# Patient Record
Sex: FEMALE | Race: WHITE
[De-identification: names, ages, dates, MRNs, and addresses within clinical notes are randomized per-mention and may not be internally consistent; named-entity substitution may affect disease eponyms.]

---

## 2017-03-10 ENCOUNTER — HOSPITAL ENCOUNTER (EMERGENCY)
Dept: HOSPITAL 26 - MED | Age: 37
LOS: 1 days | Discharge: HOME | End: 2017-03-11
Payer: MEDICAID

## 2017-03-10 VITALS — HEIGHT: 64 IN | WEIGHT: 144 LBS | BODY MASS INDEX: 24.59 KG/M2

## 2017-03-10 VITALS — DIASTOLIC BLOOD PRESSURE: 71 MMHG | SYSTOLIC BLOOD PRESSURE: 104 MMHG

## 2017-03-10 DIAGNOSIS — R10.30: ICD-10-CM

## 2017-03-10 DIAGNOSIS — O26.891: Primary | ICD-10-CM

## 2017-03-10 DIAGNOSIS — Z3A.12: ICD-10-CM

## 2017-03-10 DIAGNOSIS — R03.0: ICD-10-CM

## 2017-03-11 VITALS — SYSTOLIC BLOOD PRESSURE: 104 MMHG | DIASTOLIC BLOOD PRESSURE: 71 MMHG

## 2017-03-11 NOTE — NUR
36Y F BIB NIECE C/O OF ABD PAIN, DENIES N/V. PAIN 9/10 IN SCALE THAT STARTED 
LAST NIGHT AROUND 8PM. V/S WNL.

## 2017-03-11 NOTE — NUR
Patient discharged with v/s stable. Written and verbal after care instructions 
given and explained. 

Patient verbalized understanding. Ambulatory with to car. All questions 
addressed prior to discharge. Advised to follow up with PMD.

## 2017-04-18 ENCOUNTER — HOSPITAL ENCOUNTER (EMERGENCY)
Dept: HOSPITAL 26 - MED | Age: 37
Discharge: HOME | End: 2017-04-18
Payer: MEDICAID

## 2017-04-18 VITALS — DIASTOLIC BLOOD PRESSURE: 58 MMHG | SYSTOLIC BLOOD PRESSURE: 120 MMHG

## 2017-04-18 VITALS — DIASTOLIC BLOOD PRESSURE: 72 MMHG | SYSTOLIC BLOOD PRESSURE: 114 MMHG

## 2017-04-18 VITALS — HEIGHT: 64 IN | WEIGHT: 147.5 LBS | BODY MASS INDEX: 25.18 KG/M2

## 2017-04-18 DIAGNOSIS — Z3A.17: ICD-10-CM

## 2017-04-18 DIAGNOSIS — O26.892: Primary | ICD-10-CM

## 2017-04-18 DIAGNOSIS — R51: ICD-10-CM

## 2017-04-18 PROCEDURE — 81002 URINALYSIS NONAUTO W/O SCOPE: CPT

## 2017-04-18 PROCEDURE — 70450 CT HEAD/BRAIN W/O DYE: CPT

## 2017-04-18 PROCEDURE — 99284 EMERGENCY DEPT VISIT MOD MDM: CPT

## 2017-04-18 PROCEDURE — 96372 THER/PROPH/DIAG INJ SC/IM: CPT

## 2017-04-18 PROCEDURE — 76801 OB US < 14 WKS SINGLE FETUS: CPT

## 2017-04-18 PROCEDURE — 81025 URINE PREGNANCY TEST: CPT

## 2017-04-18 NOTE — NUR
36/F PRESENTS TO ED FOR EVALUATION OF LEFT SIDE HEADACHE, LEFT JAW PAIN, LEFT 
FACE X3 DAYS. PT STATES SHE WENT TO SEE HER DENTIST ON SATURDAY AND THEY TOLD 
HER SHE DOES NOT HAVE ANY INFECTION. PATIENT C/O 8/10 PAIN. PT DENIES DIZZINESS 
OR SYNCOPE. PT DENIES CHANGES IN VISION. PT IS AOX4, [place] SPEAKING. VSS.

## 2017-04-18 NOTE — NUR
Patient discharged with v/s stable. Written and verbal after care instructions 
given and explained. 

Patient alert, oriented and verbalized understanding of instructions. 
Ambulatory with steady gait. All questions addressed prior to discharge. ID 
band removed. Patient advised to follow up with PMD. Rx of COMPAZINE AND 
BENADRYL given. Patient educated on indication of medication including possible 
reaction and side effects. Opportunity to ask questions provided and answered.

## 2017-11-17 ENCOUNTER — HOSPITAL ENCOUNTER (EMERGENCY)
Dept: HOSPITAL 26 - MED | Age: 37
Discharge: LEFT BEFORE DISCHARGE | End: 2017-11-17
Payer: MEDICAID

## 2017-11-17 VITALS — BODY MASS INDEX: 26.63 KG/M2 | WEIGHT: 156 LBS | HEIGHT: 64 IN

## 2017-11-17 VITALS — SYSTOLIC BLOOD PRESSURE: 117 MMHG | DIASTOLIC BLOOD PRESSURE: 62 MMHG

## 2017-11-17 DIAGNOSIS — R51: Primary | ICD-10-CM

## 2017-11-17 DIAGNOSIS — Z53.21: ICD-10-CM

## 2017-11-22 ENCOUNTER — HOSPITAL ENCOUNTER (EMERGENCY)
Dept: HOSPITAL 26 - MED | Age: 37
LOS: 1 days | Discharge: HOME | End: 2017-11-23
Payer: MEDICAID

## 2017-11-22 VITALS — DIASTOLIC BLOOD PRESSURE: 82 MMHG | SYSTOLIC BLOOD PRESSURE: 141 MMHG

## 2017-11-22 VITALS — BODY MASS INDEX: 26.12 KG/M2 | WEIGHT: 153 LBS | HEIGHT: 64 IN

## 2017-11-22 DIAGNOSIS — J02.9: Primary | ICD-10-CM

## 2017-11-22 PROCEDURE — 99284 EMERGENCY DEPT VISIT MOD MDM: CPT

## 2017-11-22 PROCEDURE — 87081 CULTURE SCREEN ONLY: CPT

## 2017-11-22 PROCEDURE — 36415 COLL VENOUS BLD VENIPUNCTURE: CPT

## 2017-11-22 PROCEDURE — 87804 INFLUENZA ASSAY W/OPTIC: CPT

## 2017-11-22 PROCEDURE — 96372 THER/PROPH/DIAG INJ SC/IM: CPT

## 2017-11-23 VITALS — DIASTOLIC BLOOD PRESSURE: 66 MMHG | SYSTOLIC BLOOD PRESSURE: 111 MMHG

## 2017-11-23 NOTE — NUR
Patient discharged with v/s stable. Written and verbal after care instructions 
given and explained. 

Patient alert, oriented and verbalized understanding of instructions. 
Ambulatory with steady gait. All questions addressed prior to discharge. ID 
band removed. Patient advised to follow up with PMD. Rx of prednisone given. 
Patient educated on indication of medication including possible reaction and 
side effects. Opportunity to ask questions provided and answered.

## 2019-08-06 ENCOUNTER — HOSPITAL ENCOUNTER (EMERGENCY)
Dept: HOSPITAL 26 - MED | Age: 39
Discharge: HOME | End: 2019-08-06
Payer: COMMERCIAL

## 2019-08-06 VITALS — DIASTOLIC BLOOD PRESSURE: 74 MMHG | SYSTOLIC BLOOD PRESSURE: 110 MMHG

## 2019-08-06 VITALS — HEIGHT: 64 IN | BODY MASS INDEX: 23.41 KG/M2 | WEIGHT: 137.12 LBS

## 2019-08-06 VITALS — DIASTOLIC BLOOD PRESSURE: 72 MMHG | SYSTOLIC BLOOD PRESSURE: 112 MMHG

## 2019-08-06 DIAGNOSIS — N39.0: ICD-10-CM

## 2019-08-06 DIAGNOSIS — G44.209: Primary | ICD-10-CM

## 2019-08-06 DIAGNOSIS — R42: ICD-10-CM

## 2019-08-06 LAB
APPEARANCE UR: CLEAR
BILIRUB UR QL STRIP: NEGATIVE
COLOR UR: YELLOW
GLUCOSE UR STRIP-MCNC: NEGATIVE MG/DL
HGB UR QL STRIP: NEGATIVE
LEUKOCYTE ESTERASE UR QL STRIP: NEGATIVE
NITRITE UR QL STRIP: NEGATIVE
PH UR STRIP: 8 [PH] (ref 5–9)

## 2019-08-06 PROCEDURE — 81025 URINE PREGNANCY TEST: CPT

## 2019-08-06 PROCEDURE — 96374 THER/PROPH/DIAG INJ IV PUSH: CPT

## 2019-08-06 PROCEDURE — 99283 EMERGENCY DEPT VISIT LOW MDM: CPT

## 2019-08-06 PROCEDURE — 96372 THER/PROPH/DIAG INJ SC/IM: CPT

## 2019-08-06 PROCEDURE — 96375 TX/PRO/DX INJ NEW DRUG ADDON: CPT

## 2019-08-06 PROCEDURE — 81003 URINALYSIS AUTO W/O SCOPE: CPT

## 2019-08-06 PROCEDURE — 87086 URINE CULTURE/COLONY COUNT: CPT

## 2019-08-06 PROCEDURE — 96361 HYDRATE IV INFUSION ADD-ON: CPT

## 2019-08-06 NOTE — NUR
BIB SELF C/O HEADACHE, DIZZINESS, AND PHOTOPHOBIA X 0600. DENIES N/V. 
DISCOMFORT UPON SWALLOWING X 3 WEEKS, BURNING URINATION X 9 DAYS. FINISHED UTI 
ABX YESTERDAY. UC CALLED PT YESTERDAY TO RETURN DUE TO BACTERIA IN URINE. 
DENIES ABD PAIN/BACK PAIN. 

A&OX4, CLEAR SPEECH, NO WEAKNESS, NO FACIAL DROOP, AMBULATES WITH STEADY GAIT. 



HX: LUPUS, SJOGREN'S SYNDROME

RX: DENIES

## 2019-08-06 NOTE — NUR
Patient discharged with v/s stable. Written and verbal after care instructions 
given and explained. Patient alert, oriented and verbalized understanding of 
instructions. Ambulatory with steady gait. All questions addressed prior to 
discharge. ID band removed. Patient advised to follow up with PMD. Rx of 
Bactrim and Tylenol  given. Patient educated on indication of medication 
including possible reaction and side effects. Opportunity to ask questions 
provided and answered.

## 2019-11-11 ENCOUNTER — HOSPITAL ENCOUNTER (EMERGENCY)
Dept: HOSPITAL 26 - MED | Age: 39
Discharge: HOME | End: 2019-11-11
Payer: COMMERCIAL

## 2019-11-11 VITALS — DIASTOLIC BLOOD PRESSURE: 53 MMHG | SYSTOLIC BLOOD PRESSURE: 100 MMHG

## 2019-11-11 VITALS — DIASTOLIC BLOOD PRESSURE: 89 MMHG | SYSTOLIC BLOOD PRESSURE: 119 MMHG

## 2019-11-11 VITALS — WEIGHT: 137 LBS | HEIGHT: 64 IN | BODY MASS INDEX: 23.39 KG/M2

## 2019-11-11 DIAGNOSIS — R10.2: Primary | ICD-10-CM

## 2019-11-11 DIAGNOSIS — Z88.6: ICD-10-CM

## 2019-11-11 DIAGNOSIS — Z88.0: ICD-10-CM

## 2019-11-11 LAB
ALBUMIN FLD-MCNC: 4.1 G/DL (ref 3.4–5)
ANION GAP SERPL CALCULATED.3IONS-SCNC: 13.9 MMOL/L (ref 8–16)
AST SERPL-CCNC: 21 U/L (ref 15–37)
BASOPHILS # BLD AUTO: 0 K/UL (ref 0–0.22)
BASOPHILS NFR BLD AUTO: 0.4 % (ref 0–2)
BILIRUB SERPL-MCNC: 0.3 MG/DL (ref 0–1)
BUN SERPL-MCNC: 10 MG/DL (ref 7–18)
CHLORIDE SERPL-SCNC: 105 MMOL/L (ref 98–107)
CO2 SERPL-SCNC: 26.7 MMOL/L (ref 21–32)
CREAT SERPL-MCNC: 0.7 MG/DL (ref 0.6–1.3)
EOSINOPHIL # BLD AUTO: 0.1 K/UL (ref 0–0.4)
EOSINOPHIL NFR BLD AUTO: 1.7 % (ref 0–4)
ERYTHROCYTE [DISTWIDTH] IN BLOOD BY AUTOMATED COUNT: 13.3 % (ref 11.6–13.7)
GFR SERPL CREATININE-BSD FRML MDRD: 121 ML/MIN (ref 90–?)
GLUCOSE SERPL-MCNC: 94 MG/DL (ref 74–106)
HCT VFR BLD AUTO: 40.8 % (ref 36–48)
HGB BLD-MCNC: 13.4 G/DL (ref 12–16)
LYMPHOCYTES # BLD AUTO: 1.7 K/UL (ref 2.5–16.5)
LYMPHOCYTES NFR BLD AUTO: 29.7 % (ref 20.5–51.1)
MCH RBC QN AUTO: 28 PG (ref 27–31)
MCHC RBC AUTO-ENTMCNC: 33 G/DL (ref 33–37)
MCV RBC AUTO: 85.6 FL (ref 80–94)
MONOCYTES # BLD AUTO: 0.4 K/UL (ref 0.8–1)
MONOCYTES NFR BLD AUTO: 7.7 % (ref 1.7–9.3)
NEUTROPHILS # BLD AUTO: 3.4 K/UL (ref 1.8–7.7)
NEUTROPHILS NFR BLD AUTO: 60.5 % (ref 42.2–75.2)
PLATELET # BLD AUTO: 205 K/UL (ref 140–450)
POTASSIUM SERPL-SCNC: 3.6 MMOL/L (ref 3.5–5.1)
RBC # BLD AUTO: 4.77 MIL/UL (ref 4.2–5.4)
SODIUM SERPL-SCNC: 142 MMOL/L (ref 136–145)
WBC # BLD AUTO: 5.7 K/UL (ref 4.8–10.8)

## 2019-11-11 PROCEDURE — 80053 COMPREHEN METABOLIC PANEL: CPT

## 2019-11-11 PROCEDURE — 74018 RADEX ABDOMEN 1 VIEW: CPT

## 2019-11-11 PROCEDURE — 81002 URINALYSIS NONAUTO W/O SCOPE: CPT

## 2019-11-11 PROCEDURE — 76705 ECHO EXAM OF ABDOMEN: CPT

## 2019-11-11 PROCEDURE — 99284 EMERGENCY DEPT VISIT MOD MDM: CPT

## 2019-11-11 PROCEDURE — 81025 URINE PREGNANCY TEST: CPT

## 2019-11-11 PROCEDURE — 85025 COMPLETE CBC W/AUTO DIFF WBC: CPT

## 2019-11-11 PROCEDURE — 36415 COLL VENOUS BLD VENIPUNCTURE: CPT

## 2019-11-11 PROCEDURE — 96375 TX/PRO/DX INJ NEW DRUG ADDON: CPT

## 2019-11-11 PROCEDURE — 96374 THER/PROPH/DIAG INJ IV PUSH: CPT

## 2019-11-11 PROCEDURE — 76830 TRANSVAGINAL US NON-OB: CPT

## 2019-11-11 NOTE — NUR
PT RESTING IN BED IN COMFORTABLE POSITION, BED LOCKED AND IN LOW POSITION, VSS. 
WILL CONTINUE TO MONITOR.

## 2019-11-11 NOTE — NUR
-------------------------------------------------------------------------------

            *** Note undone in ED - 11/11/19 at 1938 by DAWN ***            

-------------------------------------------------------------------------------

PT TAKEN TO XRAY

## 2019-11-11 NOTE — NUR
40 Y/O FEMALE FROM HOME WITH C/O RIGHT LOWER QUADRANT PAIN X1 DAY. ABD SOFT, 
ROUND, TENDER TO PALP. BOWEL SOUNDS PRESENT X4 QUADRANTS. PT C/O NAUSEA. DENIES 
VOMITING AND DIARRHEA. LAST BM AT NOON. DENIES FEVER/CHILLS. PT SEEN AT URGENT 
CARE PRIOR TO ER AND WAS TOLD TO COME TO ER TO RULE OUT APPENDICITIS. RR EVEN 
AND UNLABORED. PT SITTING IN BED WITH FAMILY AT BEDSIDE. VSS. 



MEDHX: LUPUS 

ALLERGIES: IBURPROFEN, AMOXICILLIN.

## 2019-11-11 NOTE — NUR
Patient discharged with v/s stable. Written and verbal after care instructions 
given and explained. 

Patient alert, oriented and verbalized understanding of instructions. 
Ambulatory with steady gait. All questions addressed prior to discharge. ID 
band removed. Patient advised to follow up with PMD. Rx of ZOFRAN ODT, MOTRIN 
given. Patient educated on indication of medication including possible reaction 
and side effects. Opportunity to ask questions provided and answered.

## 2020-11-24 ENCOUNTER — HOSPITAL ENCOUNTER (EMERGENCY)
Dept: HOSPITAL 26 - MED | Age: 40
Discharge: HOME | End: 2020-11-24
Payer: COMMERCIAL

## 2020-11-24 VITALS — HEIGHT: 64 IN | BODY MASS INDEX: 28.51 KG/M2 | WEIGHT: 167 LBS

## 2020-11-24 VITALS — SYSTOLIC BLOOD PRESSURE: 102 MMHG | DIASTOLIC BLOOD PRESSURE: 70 MMHG

## 2020-11-24 VITALS — DIASTOLIC BLOOD PRESSURE: 69 MMHG | SYSTOLIC BLOOD PRESSURE: 132 MMHG

## 2020-11-24 DIAGNOSIS — Z88.1: ICD-10-CM

## 2020-11-24 DIAGNOSIS — R21: Primary | ICD-10-CM

## 2020-11-24 DIAGNOSIS — L12.8: ICD-10-CM

## 2020-11-24 DIAGNOSIS — Z88.6: ICD-10-CM

## 2020-11-24 NOTE — NUR
39 Y/O FEMALE C/O RASHES ALL OVER HER BODY WITH BLISTER FOR 2 DAYS. PT STATES 
6/10 BURNING PAIN. PT SATES SHE WENT TO AN URGENT CARE, IN WHICH THEY STATED 
SHE HAD INFECTION ON HER BLISTERS. BLISTERS NOTED ON R/L ARMS. RED RASHES NOTED 
THROUGHOUT THE BODY. 



MEDHX: LUPUS

ALLERGIES: AMOXICILLIN, IBUPROFEN.

## 2020-11-24 NOTE — NUR
Patient discharged with v/s stable. Written and verbal after care instructions 
given and explained. 

Patient alert, oriented and verbalized understanding of instructions. 
Ambulatory with steady gait. All questions addressed prior to discharge. ID 
band removed. Patient advised to follow up with PMD. Rx of PREDNISONE AND 
BENADRYL given. Patient educated on indication of medication including possible 
reaction and side effects. Opportunity to ask questions provided and answered. Bilobed Flap Text: The defect edges were debeveled with a #15 scalpel blade.  Given the location of the defect and the proximity to free margins a bilobe flap was deemed most appropriate.  Using a sterile surgical marker, an appropriate bilobe flap drawn around the defect.    The area thus outlined was incised deep to adipose tissue with a #15 scalpel blade.  The skin margins were undermined to an appropriate distance in all directions utilizing iris scissors.

## 2021-02-19 ENCOUNTER — HOSPITAL ENCOUNTER (EMERGENCY)
Dept: HOSPITAL 26 - MED | Age: 41
Discharge: HOME | End: 2021-02-19
Payer: COMMERCIAL

## 2021-02-19 VITALS — SYSTOLIC BLOOD PRESSURE: 113 MMHG | DIASTOLIC BLOOD PRESSURE: 62 MMHG

## 2021-02-19 VITALS — WEIGHT: 161 LBS | BODY MASS INDEX: 30.4 KG/M2 | HEIGHT: 61 IN

## 2021-02-19 DIAGNOSIS — Z88.6: ICD-10-CM

## 2021-02-19 DIAGNOSIS — Z88.1: ICD-10-CM

## 2021-02-19 DIAGNOSIS — N20.0: Primary | ICD-10-CM

## 2021-02-19 LAB
BASOPHILS # BLD AUTO: 0 K/UL (ref 0–0.22)
BASOPHILS NFR BLD AUTO: 0.5 % (ref 0–2)
EOSINOPHIL # BLD AUTO: 0.1 K/UL (ref 0–0.4)
EOSINOPHIL NFR BLD AUTO: 1.1 % (ref 0–4)
ERYTHROCYTE [DISTWIDTH] IN BLOOD BY AUTOMATED COUNT: 13.1 % (ref 11.6–13.7)
HCT VFR BLD AUTO: 40.1 % (ref 36–48)
HGB BLD-MCNC: 13.1 G/DL (ref 12–16)
LYMPHOCYTES # BLD AUTO: 1.9 K/UL (ref 2.5–16.5)
LYMPHOCYTES NFR BLD AUTO: 29.4 % (ref 20.5–51.1)
MCH RBC QN AUTO: 28 PG (ref 27–31)
MCHC RBC AUTO-ENTMCNC: 33 G/DL (ref 33–37)
MCV RBC AUTO: 84.8 FL (ref 80–94)
MONOCYTES # BLD AUTO: 0.5 K/UL (ref 0.8–1)
MONOCYTES NFR BLD AUTO: 7.2 % (ref 1.7–9.3)
NEUTROPHILS # BLD AUTO: 4 K/UL (ref 1.8–7.7)
NEUTROPHILS NFR BLD AUTO: 61.8 % (ref 42.2–75.2)
PLATELET # BLD AUTO: 227 K/UL (ref 140–450)
RBC # BLD AUTO: 4.72 MIL/UL (ref 4.2–5.4)
WBC # BLD AUTO: 6.4 K/UL (ref 4.8–10.8)

## 2021-02-19 NOTE — NUR
41 y/o female referred from urgent care for rule out appendicitis. Pt states 
RLQ pain x 6 hrs. Denies nausea/vomiting/diarrhea. Afebrile upon arrival. Awake 
and alert. 8/10 sharp pain to RLQ. VSS 



medhx: Lupus

## 2021-02-19 NOTE — NUR
EXAM RESULTS RETURNED, REVIEWED. PT READY FOR DISCHARGE. ARMBAND REMOVED. D/C'D 
HOME AMBULATORY IN NAD. ACI AND RX TRAMADOL IN POSESSION WITH UNDERSTANDING 
EXPRESSED

## 2021-10-27 ENCOUNTER — HOSPITAL ENCOUNTER (EMERGENCY)
Dept: HOSPITAL 26 - MED | Age: 41
Discharge: HOME | End: 2021-10-27
Payer: COMMERCIAL

## 2021-10-27 VITALS — HEIGHT: 64 IN | BODY MASS INDEX: 26.63 KG/M2 | WEIGHT: 156 LBS

## 2021-10-27 VITALS — DIASTOLIC BLOOD PRESSURE: 70 MMHG | SYSTOLIC BLOOD PRESSURE: 128 MMHG

## 2021-10-27 DIAGNOSIS — R11.0: ICD-10-CM

## 2021-10-27 DIAGNOSIS — Z88.1: ICD-10-CM

## 2021-10-27 DIAGNOSIS — R51.9: Primary | ICD-10-CM

## 2021-10-27 DIAGNOSIS — Z88.6: ICD-10-CM

## 2021-10-27 DIAGNOSIS — Z98.890: ICD-10-CM

## 2021-10-27 PROCEDURE — 96361 HYDRATE IV INFUSION ADD-ON: CPT

## 2021-10-27 PROCEDURE — 99284 EMERGENCY DEPT VISIT MOD MDM: CPT

## 2021-10-27 PROCEDURE — 96374 THER/PROPH/DIAG INJ IV PUSH: CPT

## 2021-10-27 PROCEDURE — 81002 URINALYSIS NONAUTO W/O SCOPE: CPT

## 2021-10-27 RX ADMIN — METOCLOPRAMIDE ONE MG: 5 INJECTION, SOLUTION INTRAMUSCULAR; INTRAVENOUS at 17:45

## 2021-10-27 RX ADMIN — SODIUM CHLORIDE ONE MLS/HR: 9 INJECTION, SOLUTION INTRAVENOUS at 17:44

## 2021-10-27 NOTE — NUR
Patient asleep in semi-nguyễn position. Awaken and states she feels better. 
Denies nausea / headache.

## 2021-10-27 NOTE — NUR
40 y/o F BIB self from home c/o headache + nausea x 1 day that worsened today. 
Patient A&Ox4, ambulatory, reports left sided headache 8/10, dull/constant, 
non-radiating pain. Patient states takin Tylenol yesterday without relief to 
pain. Patient denies vomiting, fever, chills, dysuria, SOB, chest pain, neck 
pain. Denies any known triggers to headache, states similar episodes of 
headaches in the past but has never been seen by neurologist for symptoms. 
Patient placed into a gown. Bed locked in lowest position, side rails x 1, call 
light in reach. 



PMH: LUPUS

ALLERGY: IBUPROFEN, AMOXICILLIN (HIVES)

MED: DENIES

Sx: C-sections

## 2021-10-27 NOTE — NUR
Patient discharged with v/s stable. Written and verbal after care instructions 
given and explained. 

Patient alert, oriented and verbalized understanding of instructions. 
Ambulatory with steady gait. All questions addressed prior to discharge. ID 
band removed. Patient advised to follow up with PMD. Rx of  
Hydrocodone/Acetaminophen, Zofran given. Patient educated on indication of 
medication including possible reaction and side effects. Opportunity to ask 
questions provided and answered.

## 2022-01-17 ENCOUNTER — HOSPITAL ENCOUNTER (EMERGENCY)
Dept: HOSPITAL 26 - MED | Age: 42
Discharge: HOME | End: 2022-01-17
Payer: COMMERCIAL

## 2022-01-17 VITALS — SYSTOLIC BLOOD PRESSURE: 104 MMHG | DIASTOLIC BLOOD PRESSURE: 54 MMHG

## 2022-01-17 VITALS — BODY MASS INDEX: 26.63 KG/M2 | HEIGHT: 64 IN | WEIGHT: 156 LBS

## 2022-01-17 VITALS — DIASTOLIC BLOOD PRESSURE: 54 MMHG | SYSTOLIC BLOOD PRESSURE: 104 MMHG

## 2022-01-17 DIAGNOSIS — N83.202: Primary | ICD-10-CM

## 2022-01-17 DIAGNOSIS — Z88.6: ICD-10-CM

## 2022-01-17 DIAGNOSIS — Z88.1: ICD-10-CM

## 2022-01-17 DIAGNOSIS — Z79.899: ICD-10-CM

## 2022-01-17 LAB
APPEARANCE UR: CLEAR
BILIRUB UR QL STRIP: NEGATIVE
COLOR UR: YELLOW
GLUCOSE UR STRIP-MCNC: NEGATIVE MG/DL
HGB UR QL STRIP: NEGATIVE
LEUKOCYTE ESTERASE UR QL STRIP: NEGATIVE
NITRITE UR QL STRIP: NEGATIVE
PH UR STRIP: 6 [PH] (ref 5–9)

## 2022-01-17 NOTE — NUR
42 y/o female, c/o pelvic pain that radiates to lower back for 1 day. denies 
n/v/d. denies dysuria, hematuria, urinary retention or frequency. denies 
syncope, loc or head/neck injury. skin is pink/warm/dry. aa&ox4 with even and 
steady gait. lungs clear bl. hr even and regular, cap refill <3, no edema 
present at this time. pt denies any fever, cp, sob or cough at this time. 
patient states pain is 8/10 at this time. ermd made aware of pt status. 



pmh: lupus

allergy: amoxicillin, ibuprofen

## 2022-04-04 ENCOUNTER — HOSPITAL ENCOUNTER (EMERGENCY)
Dept: HOSPITAL 26 - MED | Age: 42
Discharge: HOME | End: 2022-04-04
Payer: COMMERCIAL

## 2022-04-04 VITALS — HEIGHT: 64 IN | BODY MASS INDEX: 27.04 KG/M2 | WEIGHT: 158.37 LBS

## 2022-04-04 VITALS — SYSTOLIC BLOOD PRESSURE: 119 MMHG | DIASTOLIC BLOOD PRESSURE: 75 MMHG

## 2022-04-04 VITALS — DIASTOLIC BLOOD PRESSURE: 84 MMHG | SYSTOLIC BLOOD PRESSURE: 119 MMHG

## 2022-04-04 DIAGNOSIS — Z79.2: ICD-10-CM

## 2022-04-04 DIAGNOSIS — Z88.6: ICD-10-CM

## 2022-04-04 DIAGNOSIS — Z79.891: ICD-10-CM

## 2022-04-04 DIAGNOSIS — H20.00: Primary | ICD-10-CM

## 2022-04-04 DIAGNOSIS — Z88.0: ICD-10-CM

## 2022-04-04 DIAGNOSIS — Z79.899: ICD-10-CM

## 2022-04-04 NOTE — NUR
Pt given pain medication per SHAYAN Christianson order for 4/10 pain in bilat eye 
irritation. Acceptable pain tolerance is 1/10.

## 2022-04-04 NOTE — NUR
42 Y/O Female BIB son for c/o "eye irritation after having eyelash extensions 
on 04/02" AOX4, able to make needs known. Pt bilat eye sclera is red and watery 
at this time. Pt denies vision changes, trauma to her eyes. Denies N/V/D/CP at 
this time. 



PmHx: hysterrectomy

Allergies: PCN, Ibuprofen

## 2022-07-05 ENCOUNTER — HOSPITAL ENCOUNTER (EMERGENCY)
Dept: HOSPITAL 26 - MED | Age: 42
LOS: 1 days | Discharge: HOME | End: 2022-07-06
Payer: COMMERCIAL

## 2022-07-05 VITALS — SYSTOLIC BLOOD PRESSURE: 83 MMHG | DIASTOLIC BLOOD PRESSURE: 51 MMHG

## 2022-07-05 VITALS — BODY MASS INDEX: 26.89 KG/M2 | WEIGHT: 157.5 LBS | HEIGHT: 64 IN

## 2022-07-05 DIAGNOSIS — Z90.49: ICD-10-CM

## 2022-07-05 DIAGNOSIS — R10.9: Primary | ICD-10-CM

## 2022-07-05 DIAGNOSIS — Z88.6: ICD-10-CM

## 2022-07-05 DIAGNOSIS — Z98.890: ICD-10-CM

## 2022-07-05 DIAGNOSIS — Z88.1: ICD-10-CM

## 2022-07-05 DIAGNOSIS — R50.9: ICD-10-CM

## 2022-07-05 DIAGNOSIS — Z79.899: ICD-10-CM

## 2022-07-05 LAB
ALBUMIN FLD-MCNC: 3.9 G/DL (ref 3.4–5)
ANION GAP SERPL CALCULATED.3IONS-SCNC: 11.2 MMOL/L (ref 8–16)
APPEARANCE UR: CLEAR
AST SERPL-CCNC: 43 U/L (ref 15–37)
BASOPHILS # BLD AUTO: 0.1 K/UL (ref 0–0.22)
BASOPHILS NFR BLD AUTO: 1.7 % (ref 0–2)
BILIRUB SERPL-MCNC: 0.2 MG/DL (ref 0–1)
BILIRUB UR QL STRIP: NEGATIVE
BUN SERPL-MCNC: 5 MG/DL (ref 7–18)
CHLORIDE SERPL-SCNC: 102 MMOL/L (ref 98–107)
CO2 SERPL-SCNC: 27.4 MMOL/L (ref 21–32)
COLOR UR: (no result)
CREAT SERPL-MCNC: 0.7 MG/DL (ref 0.6–1.3)
EOSINOPHIL # BLD AUTO: 0.1 K/UL (ref 0–0.4)
EOSINOPHIL NFR BLD AUTO: 1.9 % (ref 0–4)
ERYTHROCYTE [DISTWIDTH] IN BLOOD BY AUTOMATED COUNT: 13.4 % (ref 11.6–13.7)
GFR SERPL CREATININE-BSD FRML MDRD: 119 ML/MIN (ref 90–?)
GLUCOSE SERPL-MCNC: 102 MG/DL (ref 74–106)
GLUCOSE UR STRIP-MCNC: NEGATIVE MG/DL
HCT VFR BLD AUTO: 40.5 % (ref 36–48)
HGB BLD-MCNC: 13.3 G/DL (ref 12–16)
HGB UR QL STRIP: (no result)
LEUKOCYTE ESTERASE UR QL STRIP: NEGATIVE
LIPASE SERPL-CCNC: 73 U/L (ref 73–393)
LYMPHOCYTES # BLD AUTO: 0.7 K/UL (ref 2.5–16.5)
LYMPHOCYTES NFR BLD AUTO: 11.6 % (ref 20.5–51.1)
MCH RBC QN AUTO: 27 PG (ref 27–31)
MCHC RBC AUTO-ENTMCNC: 33 G/DL (ref 33–37)
MCV RBC AUTO: 83.6 FL (ref 80–94)
MONOCYTES # BLD AUTO: 0.5 K/UL (ref 0.8–1)
MONOCYTES NFR BLD AUTO: 7.8 % (ref 1.7–9.3)
NEUTROPHILS # BLD AUTO: 4.8 K/UL (ref 1.8–7.7)
NEUTROPHILS NFR BLD AUTO: 77 % (ref 42.2–75.2)
NITRITE UR QL STRIP: NEGATIVE
PH UR STRIP: 5.5 [PH] (ref 5–9)
PLATELET # BLD AUTO: 181 K/UL (ref 140–450)
POTASSIUM SERPL-SCNC: 3.6 MMOL/L (ref 3.5–5.1)
RBC # BLD AUTO: 4.85 MIL/UL (ref 4.2–5.4)
RBC #/AREA URNS HPF: (no result) /HPF (ref 0–5)
SODIUM SERPL-SCNC: 137 MMOL/L (ref 136–145)
WBC # BLD AUTO: 6.2 K/UL (ref 4.8–10.8)
WBC,URINE: (no result) /HPF (ref 0–5)

## 2022-07-05 PROCEDURE — 96374 THER/PROPH/DIAG INJ IV PUSH: CPT

## 2022-07-05 PROCEDURE — 80053 COMPREHEN METABOLIC PANEL: CPT

## 2022-07-05 PROCEDURE — 36415 COLL VENOUS BLD VENIPUNCTURE: CPT

## 2022-07-05 PROCEDURE — 96361 HYDRATE IV INFUSION ADD-ON: CPT

## 2022-07-05 PROCEDURE — 83690 ASSAY OF LIPASE: CPT

## 2022-07-05 PROCEDURE — 81025 URINE PREGNANCY TEST: CPT

## 2022-07-05 PROCEDURE — 74176 CT ABD & PELVIS W/O CONTRAST: CPT

## 2022-07-05 PROCEDURE — 99285 EMERGENCY DEPT VISIT HI MDM: CPT

## 2022-07-05 PROCEDURE — 85025 COMPLETE CBC W/AUTO DIFF WBC: CPT

## 2022-07-05 PROCEDURE — 81001 URINALYSIS AUTO W/SCOPE: CPT

## 2022-07-05 RX ADMIN — FENTANYL CITRATE ONE MG: 50 INJECTION, SOLUTION INTRAMUSCULAR; INTRAVENOUS at 21:57

## 2022-07-05 RX ADMIN — SODIUM CHLORIDE ONE MLS/HR: 9 INJECTION, SOLUTION INTRAVENOUS at 19:26

## 2022-07-06 VITALS — DIASTOLIC BLOOD PRESSURE: 54 MMHG | SYSTOLIC BLOOD PRESSURE: 102 MMHG

## 2022-07-06 NOTE — NUR
Patient discharged with v/s stable. Written and verbal after care instructions 
given ABD PAIN and explained. 

Patient alert, oriented and verbalized understanding of instructions. 
Ambulatory with steady gait. All questions addressed prior to discharge. ID 
band removed. Patient advised to follow up with PMD. Rx of BENTYL given.

## 2022-08-25 ENCOUNTER — HOSPITAL ENCOUNTER (EMERGENCY)
Dept: HOSPITAL 26 - MED | Age: 42
Discharge: HOME | End: 2022-08-25
Payer: COMMERCIAL

## 2022-08-25 VITALS — BODY MASS INDEX: 26.46 KG/M2 | HEIGHT: 64 IN | WEIGHT: 155 LBS

## 2022-08-25 VITALS — SYSTOLIC BLOOD PRESSURE: 103 MMHG | DIASTOLIC BLOOD PRESSURE: 53 MMHG

## 2022-08-25 VITALS — DIASTOLIC BLOOD PRESSURE: 50 MMHG | SYSTOLIC BLOOD PRESSURE: 100 MMHG

## 2022-08-25 DIAGNOSIS — R51.9: Primary | ICD-10-CM

## 2022-08-25 DIAGNOSIS — Z88.6: ICD-10-CM

## 2022-08-25 DIAGNOSIS — Z79.899: ICD-10-CM

## 2022-08-25 DIAGNOSIS — Z88.0: ICD-10-CM

## 2022-08-25 DIAGNOSIS — Z79.891: ICD-10-CM

## 2022-08-25 LAB
ALBUMIN FLD-MCNC: 3.9 G/DL (ref 3.4–5)
ANION GAP SERPL CALCULATED.3IONS-SCNC: 12.5 MMOL/L (ref 8–16)
AST SERPL-CCNC: 77 U/L (ref 15–37)
BASOPHILS # BLD AUTO: 0 K/UL (ref 0–0.22)
BASOPHILS NFR BLD AUTO: 0.1 % (ref 0–2)
BILIRUB SERPL-MCNC: 0.4 MG/DL (ref 0–1)
BUN SERPL-MCNC: 7 MG/DL (ref 7–18)
CHLORIDE SERPL-SCNC: 103 MMOL/L (ref 98–107)
CO2 SERPL-SCNC: 27 MMOL/L (ref 21–32)
CREAT SERPL-MCNC: 0.9 MG/DL (ref 0.6–1.3)
EOSINOPHIL # BLD AUTO: 0.1 K/UL (ref 0–0.4)
EOSINOPHIL NFR BLD AUTO: 1 % (ref 0–4)
ERYTHROCYTE [DISTWIDTH] IN BLOOD BY AUTOMATED COUNT: 13.7 % (ref 11.6–13.7)
GFR SERPL CREATININE-BSD FRML MDRD: 89 ML/MIN (ref 90–?)
GLUCOSE SERPL-MCNC: 109 MG/DL (ref 74–106)
HCT VFR BLD AUTO: 39.6 % (ref 36–48)
HGB BLD-MCNC: 13 G/DL (ref 12–16)
LYMPHOCYTES # BLD AUTO: 0.6 K/UL (ref 2.5–16.5)
LYMPHOCYTES NFR BLD AUTO: 6.5 % (ref 20.5–51.1)
MCH RBC QN AUTO: 28 PG (ref 27–31)
MCHC RBC AUTO-ENTMCNC: 33 G/DL (ref 33–37)
MCV RBC AUTO: 83.8 FL (ref 80–94)
MONOCYTES # BLD AUTO: 0.4 K/UL (ref 0.8–1)
MONOCYTES NFR BLD AUTO: 4.6 % (ref 1.7–9.3)
NEUTROPHILS # BLD AUTO: 7.5 K/UL (ref 1.8–7.7)
NEUTROPHILS NFR BLD AUTO: 87.8 % (ref 42.2–75.2)
PLATELET # BLD AUTO: 190 K/UL (ref 140–450)
POTASSIUM SERPL-SCNC: 3.5 MMOL/L (ref 3.5–5.1)
RBC # BLD AUTO: 4.72 MIL/UL (ref 4.2–5.4)
SODIUM SERPL-SCNC: 139 MMOL/L (ref 136–145)
WBC # BLD AUTO: 8.5 K/UL (ref 4.8–10.8)

## 2022-08-25 PROCEDURE — 96375 TX/PRO/DX INJ NEW DRUG ADDON: CPT

## 2022-08-25 PROCEDURE — 80053 COMPREHEN METABOLIC PANEL: CPT

## 2022-08-25 PROCEDURE — 81025 URINE PREGNANCY TEST: CPT

## 2022-08-25 PROCEDURE — 81002 URINALYSIS NONAUTO W/O SCOPE: CPT

## 2022-08-25 PROCEDURE — 96361 HYDRATE IV INFUSION ADD-ON: CPT

## 2022-08-25 PROCEDURE — 36415 COLL VENOUS BLD VENIPUNCTURE: CPT

## 2022-08-25 PROCEDURE — 99284 EMERGENCY DEPT VISIT MOD MDM: CPT

## 2022-08-25 PROCEDURE — 85025 COMPLETE CBC W/AUTO DIFF WBC: CPT

## 2022-08-25 PROCEDURE — 96374 THER/PROPH/DIAG INJ IV PUSH: CPT

## 2022-08-25 NOTE — NUR
43 y/o female bib self with c/o generalized body pain, headache and bilateral 
eye redness x yesterday. Patient states she took Bactrim last night and 
symptoms started after taking medication. Patient is on Bactrim due to UTI. 
Patient has had this happen once before with a different medication. Patient 
denies fever, chills or SOB. Patient denies being around anyone who is sick. 
Patient took Tylenol with no relief.



Medical History: Lupus

ALLERGY: AMOXICILLIN, IBUPROFEN

## 2022-08-25 NOTE — NUR
Patient discharged with v/s stable. Written and verbal after care instructions 
given. 

Patient alert, oriented and verbalized understanding of instructions. 
Ambulatory with steady gait. All questions addressed prior to discharge. ID 
band removed. Patient advised to follow up with PMD. Rx of ACETAMINOPHEN, 
MACROBID AND ZOFRAN given. Opportunity to ask questions provided and answered. 
WORK NOTE HANDED TO PATIENT.

## 2023-08-03 ENCOUNTER — HOSPITAL ENCOUNTER (EMERGENCY)
Dept: HOSPITAL 26 - MED | Age: 43
Discharge: HOME | End: 2023-08-03
Payer: COMMERCIAL

## 2023-08-03 VITALS
RESPIRATION RATE: 17 BRPM | OXYGEN SATURATION: 98 % | DIASTOLIC BLOOD PRESSURE: 79 MMHG | HEART RATE: 74 BPM | SYSTOLIC BLOOD PRESSURE: 122 MMHG

## 2023-08-03 VITALS
DIASTOLIC BLOOD PRESSURE: 69 MMHG | RESPIRATION RATE: 20 BRPM | OXYGEN SATURATION: 95 % | HEART RATE: 85 BPM | SYSTOLIC BLOOD PRESSURE: 107 MMHG | TEMPERATURE: 98 F

## 2023-08-03 VITALS — HEIGHT: 64 IN | BODY MASS INDEX: 29.21 KG/M2 | WEIGHT: 171.13 LBS

## 2023-08-03 DIAGNOSIS — R11.0: ICD-10-CM

## 2023-08-03 DIAGNOSIS — Z88.6: ICD-10-CM

## 2023-08-03 DIAGNOSIS — H57.13: ICD-10-CM

## 2023-08-03 DIAGNOSIS — Z88.1: ICD-10-CM

## 2023-08-03 DIAGNOSIS — G43.909: Primary | ICD-10-CM

## 2023-08-03 DIAGNOSIS — Z79.899: ICD-10-CM

## 2023-08-03 PROCEDURE — 96372 THER/PROPH/DIAG INJ SC/IM: CPT

## 2023-08-03 PROCEDURE — 99283 EMERGENCY DEPT VISIT LOW MDM: CPT

## 2023-08-03 NOTE — NUR
Patient discharged with v/s stable. Written and verbal after care instructions 
given and explained. 

Patient alert, oriented and verbalized understanding of instructions. 
Ambulatory with steady gait. All questions addressed prior to discharge. ID 
band removed. Patient advised to follow up with PMD. Rx of EXCEDRIN MIGRAINE 
given. Patient educated on indication of medication including possible reaction 
and side effects. Opportunity to ask questions provided and answered.

## 2023-08-03 NOTE — NUR
First contact with pt. Pt bibs for ha x 4 days that is at posterior of head 
along with eye pain that is sensitive to light. Eye pain makes headache worse. 
Pt states she has had this issue before. Had headaches last week that was 
relieved by tylenol. Pt is a/o x 4, vss, no ss of acute distress, breathing 
equal and unlabored, speech clear, on monitor.

## 2023-10-02 ENCOUNTER — HOSPITAL ENCOUNTER (EMERGENCY)
Dept: HOSPITAL 26 - MED | Age: 43
LOS: 1 days | Discharge: HOME | End: 2023-10-03
Payer: COMMERCIAL

## 2023-10-02 VITALS
SYSTOLIC BLOOD PRESSURE: 135 MMHG | OXYGEN SATURATION: 99 % | DIASTOLIC BLOOD PRESSURE: 79 MMHG | TEMPERATURE: 97.3 F | RESPIRATION RATE: 20 BRPM | HEART RATE: 89 BPM

## 2023-10-02 VITALS — HEIGHT: 62 IN | WEIGHT: 148 LBS | BODY MASS INDEX: 27.23 KG/M2

## 2023-10-02 DIAGNOSIS — Z79.2: ICD-10-CM
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
## 2023-10-02 DIAGNOSIS — Z79.899: ICD-10-CM
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
## 2023-10-02 DIAGNOSIS — Z88.6: ICD-10-CM

## 2023-10-02 DIAGNOSIS — S00.83XA: Primary | ICD-10-CM

## 2023-10-02 DIAGNOSIS — Z88.0: ICD-10-CM

## 2023-10-03 VITALS
OXYGEN SATURATION: 99 % | TEMPERATURE: 97.3 F | DIASTOLIC BLOOD PRESSURE: 79 MMHG | RESPIRATION RATE: 20 BRPM | SYSTOLIC BLOOD PRESSURE: 135 MMHG | HEART RATE: 89 BPM

## 2024-10-30 NOTE — NUR
No care due was identified.  [facility] Embedded Care Due Messages. Reference number: [id number].   10/30/2024 4:13:22 PM CDT   Patient discharged with v/s stable. Written and verbal after care instructions 
given and explained with teachback. Pt pain re-evaluated after medication given 
with relief 0/10 pain. 

Patient alert, oriented and verbalized understanding of instructions. 
Ambulatory with steady gait. All questions addressed prior to discharge. ID 
band removed. Patient advised to follow up with PMD. Rx of cleocinhcl, maxitrol 
eye drops, ultram given. Patient educated on indication of medication including 
possible reaction and side effects with teachback. Opportunity to ask questions 
provided and answered.